# Patient Record
Sex: MALE | Race: WHITE | Employment: OTHER | URBAN - METROPOLITAN AREA
[De-identification: names, ages, dates, MRNs, and addresses within clinical notes are randomized per-mention and may not be internally consistent; named-entity substitution may affect disease eponyms.]

---

## 2018-07-23 ENCOUNTER — HOSPITAL ENCOUNTER (EMERGENCY)
Facility: CLINIC | Age: 33
Discharge: HOME | End: 2018-07-23
Attending: EMERGENCY MEDICINE | Admitting: EMERGENCY MEDICINE
Payer: COMMERCIAL

## 2018-07-23 VITALS
BODY MASS INDEX: 19.6 KG/M2 | TEMPERATURE: 97.7 F | DIASTOLIC BLOOD PRESSURE: 81 MMHG | OXYGEN SATURATION: 100 % | HEIGHT: 71 IN | RESPIRATION RATE: 16 BRPM | SYSTOLIC BLOOD PRESSURE: 137 MMHG | WEIGHT: 140 LBS
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
## 2018-07-23 PROCEDURE — 99282 EMERGENCY DEPT VISIT SF MDM: CPT | Performed by: EMERGENCY MEDICINE

## 2018-07-23 PROCEDURE — 99282 EMERGENCY DEPT VISIT SF MDM: CPT | Mod: Z6 | Performed by: EMERGENCY MEDICINE

## 2018-07-23 PROCEDURE — 99281 EMR DPT VST MAYX REQ PHY/QHP: CPT

## 2018-07-23 NOTE — ED AVS SNAPSHOT
[facility] Emergency Department    [address] [address]    Phone:  [phone]    Fax:  [phone]                                       Luis Felipe Mart   MRN: [id number]    Department:  [facility] Emergency Department   Date of Visit:  7/23/2018           After Visit Summary Signature Page     I have received my discharge instructions, and my questions have been answered. I have discussed any challenges I see with this plan with the nurse or doctor.    ..........................................................................................................................................  Patient/Patient Representative Signature      ..........................................................................................................................................  Patient Representative Print Name and Relationship to Patient    ..................................................               ................................................  Date                                            Time    ..........................................................................................................................................  Reviewed by Signature/Title    ...................................................              ..............................................  Date                                                            Time

## 2018-07-23 NOTE — ED AVS SNAPSHOT
[facility] Emergency Department    [address] [address]    Phone:  [phone]    Fax:  [phone]                                       Luis Felipe Mart   MRN: [id number]    Department:  [facility] Emergency Department   Date of Visit:  7/23/2018           Patient Information     Date Of Birth          1985        Your diagnoses for this visit were:     Insect bite, initial encounter        You were seen by Rubén Dominguez MD.      Follow-up Information     Follow up with [facility] Emergency Department.    Specialty:  EMERGENCY MEDICINE    Why:  If symptoms worsen    Contact information:    [address] [address] [phone]    Additional information:    The medical center is located at   [address]. (between [address] in [place], [place] miles north   of [place]).      Discharge References/Attachments     INSECT BITE (ENGLISH)    SPIDER BITE, NON-POISONOUS (ENGLISH)      24 Hour Appointment Hotline       To make an appointment at any [facility] clinic, call [phone] ([phone]). If you don't have a family doctor or clinic, we will help you find one. [facility] clinics are conveniently located to serve the needs of you and your family.             Review of your medicines      Our records show that you are taking the medicines listed below. If these are incorrect, please call your family doctor or clinic.        Dose / Directions Last dose taken    HYDROcodone-acetaminophen 5-325 MG per tablet   Commonly known as:  NORCO   Dose:  1 tablet   Quantity:  24 tablet        Take 1 tablet by mouth every 6 hours as needed for pain   Refills:  1        NO ACTIVE MEDICATIONS        .   Refills:  0                Orders Needing Specimen Collection     None      Pending Results     No orders found from 7/21/2018 to 7/24/2018.            Pending Culture Results     No orders found from 7/21/2018 to 7/24/2018.           "  Pending Results Instructions     If you had any lab results that were not finalized at the time of your Discharge, you can call the ED Lab Result RN at [phone]. You will be contacted by this team for any positive Lab results or changes in treatment. The nurses are available 7 days a week from 10A to 6:30P.  You can leave a message 24 hours per day and they will return your call.        Test Results From Your Hospital Stay               Thank you for choosing [facility]       Thank you for choosing [facility] for your care. Our goal is always to provide you with excellent care. Hearing back from our patients is one way we can continue to improve our services. Please take a few minutes to complete the written survey that you may receive in the mail after you visit with us. Thank you!        [employer] Information     [email or web address] lets you send messages to your doctor, view your test results, renew your prescriptions, schedule appointments and more. To sign up, go to [email or web address] . Click on \"Log in\" on the left side of the screen, which will take you to the Welcome page. Then click on \"Sign up Now\" on the right side of the page.     You will be asked to enter the access code listed below, as well as some personal information. Please follow the directions to create your username and password.     Your access code is: [email or web address]  Expires: 10/21/2018 10:36 PM     Your access code will  in 90 days. If you need help or a new code, please call your [facility] clinic or [phone].        Care EveryWhere ID     This is your Care EveryWhere ID. This could be used by other organizations to access your [facility] medical records  [id number]        Equal Access to Services     [facility] : Hadii heena Forbes, haylee lino, ping harman. So [facility] [phone].    ATENCIÓN: Si habla español, tiene a [place] disposición servicios gratuitos de asistencia " livan Lysuad al [phone].    We comply with applicable federal civil rights laws and [place] laws. We do not discriminate on the basis of race, color, national origin, age, disability, sex, sexual orientation, or gender identity.            After Visit Summary       This is your record. Keep this with you and show to your community pharmacist(s) and doctor(s) at your next visit.

## 2018-07-24 NOTE — ED PROVIDER NOTES
"  History     Chief Complaint   Patient presents with     Insect Bite     L leg     HPI  Luis Felipe Mart is a 33 year old male who sustained an presumed insect bite to the left lower leg below the knee in the medial aspect, noted several days ago.  There is surrounding erythema and he is concerned about development of an infection.  No known tick bite.  No redness or streaking proximally.  No fever or chills.  No other systemic signs or symptoms.  No other acute complaints or concerns.    Problem List:    Patient Active Problem List    Diagnosis Date Noted     PSVT (paroxysmal supraventricular tachycardia) (H) 02/05/2013     Priority: Medium     On holter January 2013       CARDIOVASCULAR SCREENING; LDL GOAL LESS THAN 160 10/31/2010     Priority: Medium        Past Medical History:    Past Medical History:   Diagnosis Date     AV block second degree      Lyme disease 2007/07       Past Surgical History:    Past Surgical History:   Procedure Laterality Date     NO HISTORY OF SURGERY         Family History:    Family History   Problem Relation Age of Onset     HEART DISEASE Maternal Grandfather      Cerebrovascular Disease Paternal Grandfather      C.A.D. Paternal Grandfather      by pass     HEART DISEASE Paternal Grandfather      MI     C.A.D. Paternal Grandmother      by pass       Social History:  Marital Status:   [2]  Social History   Substance Use Topics     Smoking status: Never Smoker     Smokeless tobacco: Never Used     Alcohol use No        Medications:      HYDROcodone-acetaminophen (NORCO) 5-325 MG per tablet   NO ACTIVE MEDICATIONS         Review of Systems   Constitutional: Negative.    Musculoskeletal: Negative.    Skin: Positive for color change ( Erythema about a presumed insect bite on the left lower leg).       Physical Exam   BP: 137/81  Heart Rate: 62  Temp: 97.7  F (36.5  C)  Resp: 16  Height: 180.3 cm (5' 11\")  Weight: 63.5 kg (140 lb)  SpO2: 100 %      Physical Exam   Constitutional: " He is oriented to person, place, and time. He appears well-developed and well-nourished. No distress.   Musculoskeletal: Normal range of motion. He exhibits no edema or tenderness.   Neurological: He is oriented to person, place, and time.   Skin: Skin is warm and dry. No rash noted. No pallor.   Approximately 2.5 cm area of dusky confluent erythema on the medial left lower leg below the knee with no warmth or lymphangitis.  This is not bright red, violaceous or cellulitic.  No abscess, fluctuance or crepitance.   Psychiatric: He has a normal mood and affect. His behavior is normal.   Nursing note and vitals reviewed.      ED Course     ED Course     Procedures                 No results found for this or any previous visit (from the past 24 hour(s)).    Medications - No data to display    Assessments & Plan (with Medical Decision Making)   Noninfected left lower extremity insect bite.  Doubt Lyme disease or EM rash.  He was instructed on supportive care and signs and symptoms would indicate need for emergent reevaluation.  He will return for new problems or concerns.    I have reviewed the nursing notes.    I have reviewed the findings, diagnosis, plan and need for follow up with the patient.    Discharge Medication List as of 7/23/2018 10:36 PM          Final diagnoses:   Insect bite, initial encounter       7/23/2018   [facility] EMERGENCY DEPARTMENT     Rubén Dominguez MD  07/27/18 0846

## 2018-07-24 NOTE — ED NOTES
Patient has small open insect bite on left leg below knee no other  Areas of distress  Patient states it itches more than hurts has no other complaints.

## 2018-07-27 ASSESSMENT — ENCOUNTER SYMPTOMS
CONSTITUTIONAL NEGATIVE: 1
COLOR CHANGE: 1
MUSCULOSKELETAL NEGATIVE: 1

## 2021-05-04 ENCOUNTER — APPOINTMENT (OUTPATIENT)
Dept: GENERAL RADIOLOGY | Facility: CLINIC | Age: 36
End: 2021-05-04
Attending: NURSE PRACTITIONER
Payer: COMMERCIAL

## 2021-05-04 ENCOUNTER — APPOINTMENT (OUTPATIENT)
Dept: URGENT CARE | Facility: CLINIC | Age: 36
End: 2021-05-04
Payer: COMMERCIAL

## 2021-05-04 ENCOUNTER — HOSPITAL ENCOUNTER (EMERGENCY)
Facility: CLINIC | Age: 36
Discharge: HOME | End: 2021-05-04
Attending: NURSE PRACTITIONER | Admitting: NURSE PRACTITIONER
Payer: COMMERCIAL

## 2021-05-04 ENCOUNTER — NURSE TRIAGE (OUTPATIENT)
Dept: NURSING | Facility: CLINIC | Age: 36
End: 2021-05-04

## 2021-05-04 VITALS
HEART RATE: 83 BPM | WEIGHT: 140 LBS | DIASTOLIC BLOOD PRESSURE: 78 MMHG | RESPIRATION RATE: 16 BRPM | TEMPERATURE: 98.2 F | BODY MASS INDEX: 19.6 KG/M2 | OXYGEN SATURATION: 100 % | SYSTOLIC BLOOD PRESSURE: 136 MMHG | HEIGHT: 71 IN

## 2021-05-04 DIAGNOSIS — U07.1 CLINICAL DIAGNOSIS OF COVID-19: ICD-10-CM

## 2021-05-04 PROCEDURE — 94640 AIRWAY INHALATION TREATMENT: CPT | Performed by: NURSE PRACTITIONER

## 2021-05-04 PROCEDURE — 71045 X-RAY EXAM CHEST 1 VIEW: CPT

## 2021-05-04 PROCEDURE — G0463 HOSPITAL OUTPT CLINIC VISIT: HCPCS | Mod: 25 | Performed by: NURSE PRACTITIONER

## 2021-05-04 PROCEDURE — 250N000013 HC RX MED GY IP 250 OP 250 PS 637: Performed by: NURSE PRACTITIONER

## 2021-05-04 PROCEDURE — 99214 OFFICE O/P EST MOD 30 MIN: CPT | Performed by: NURSE PRACTITIONER

## 2021-05-04 RX ORDER — ALBUTEROL SULFATE 90 UG/1
6 AEROSOL, METERED RESPIRATORY (INHALATION) ONCE
Status: COMPLETED | OUTPATIENT
Start: 2021-05-04 | End: 2021-05-04

## 2021-05-04 RX ORDER — GUAIFENESIN 600 MG/1
1200 TABLET, EXTENDED RELEASE ORAL 2 TIMES DAILY
Qty: 40 TABLET | Refills: 0 | Status: SHIPPED | OUTPATIENT
Start: 2021-05-04 | End: 2021-05-14

## 2021-05-04 RX ORDER — BENZONATATE 200 MG/1
200 CAPSULE ORAL 3 TIMES DAILY PRN
Qty: 30 CAPSULE | Refills: 0 | Status: SHIPPED | OUTPATIENT
Start: 2021-05-04

## 2021-05-04 RX ADMIN — ALBUTEROL SULFATE 6 PUFF: 90 AEROSOL, METERED RESPIRATORY (INHALATION) at 17:36

## 2021-05-04 ASSESSMENT — MIFFLIN-ST. JEOR: SCORE: 1597.17

## 2021-05-04 NOTE — ED PROVIDER NOTES
History     Chief Complaint   Patient presents with     covid concern     HPI    SUBJECTIVE: Luis Felipe Mart  is here today because of:covid concern  The patient has had symptoms of cough, chest congestion and wheezing, fatigue, decreased appetite, decreased activity.   Onset of symptoms was 4 days ago. Course of illness is same.  Original covid symptoms started 10 days ago and included body aches, chills, headache, earache, lost of taste and smell and these symptoms have resolved.  Patient denies vomiting and diarrhea  Treatment measures tried include emergenC packets, water.  Patient is not exposed to tobacco, no THC use, denies asthma use, no previous hx of pneumonia    Allergies:  Allergies   Allergen Reactions     Minocycline Swelling     Seems to get throat swelling, feels like lump in throat, and sometimes a little effort to breathe.       Problem List:    Patient Active Problem List    Diagnosis Date Noted     PSVT (paroxysmal supraventricular tachycardia) (H) 02/05/2013     Priority: Medium     On holter January 2013       CARDIOVASCULAR SCREENING; LDL GOAL LESS THAN 160 10/31/2010     Priority: Medium        Past Medical History:    Past Medical History:   Diagnosis Date     AV block second degree      Lyme disease 2007/07       Past Surgical History:    Past Surgical History:   Procedure Laterality Date     NO HISTORY OF SURGERY         Family History:    Family History   Problem Relation Age of Onset     Heart Disease Maternal Grandfather      Cerebrovascular Disease Paternal Grandfather      C.A.D. Paternal Grandfather         by pass     Heart Disease Paternal Grandfather         ANTOINE JENKINSASONIA. Paternal Grandmother         by pass       Social History:  Marital Status:   [2]  Social History     Tobacco Use     Smoking status: Never Smoker     Smokeless tobacco: Never Used   Substance Use Topics     Alcohol use: No     Drug use: No        Medications:    benzonatate (TESSALON) 200 MG  "capsule  guaiFENesin (MUCINEX) 600 MG 12 hr tablet  HYDROcodone-acetaminophen (NORCO) 5-325 MG per tablet  NO ACTIVE MEDICATIONS      Review of Systems  As mentioned above in the history present illness. All other systems were reviewed and are negative.    Physical Exam   BP: 136/78  Pulse: 83  Temp: 98.2  F (36.8  C)  Resp: 16  Height: 180.3 cm (5' 11\")  Weight: 63.5 kg (140 lb)  SpO2: 100 %      Physical Exam  GENERAL: alert and mildly ill appearing non toxic  EYES:  Right conjunctiva is not injected and without discharge.  Left conjunctiva is not injected and without discharge.  EARS: Right TM is normal: no effusions, no erythema, and normal landmarks.  Left TM is normal: no effusions, no erythema, and normal landmarks.  NOSE: Nasal mucosa is normal.  Sinus not tender.  THROAT: normal.  NECK: supple with no adenopathy  CARDIAC:NORMAL - regular rate and rhythm without murmur.  RESP: Normal - CTA without rales, rhonchi, or wheezing.  SKIN: normal    ED Course        Procedures    Results for orders placed or performed during the hospital encounter of 05/04/21 (from the past 24 hour(s))   XR Chest Port 1 View    Narrative    XR CHEST PORT 1 VIEW 5/4/2021 5:52 PM    HISTORY: worsening cough, positive COVID 19    COMPARISON: Chest x-ray 8/29/2012      Impression    IMPRESSION: No acute findings. The lungs are clear and there are no  pleural effusions. Normal heart size.    SAMANTHA PALMER MD       Medications   albuterol (PROAIR HFA/PROVENTIL HFA/VENTOLIN HFA) 108 (90 Base) MCG/ACT inhaler 6 puff (6 puffs Inhalation Given 5/4/21 5571)       Assessments & Plan (with Medical Decision Making)     I have reviewed the nursing notes.    I have reviewed the findings, diagnosis, plan and need for follow up with the patient.  Medical Decision Making:  CXR is indicated.  Rapid Strep test is not indicated.  Patient known Covid positive    Assessment:  1) Covid illness with persistent cough and presently without signs of Covid " pneumonia and low concern for PE..    PLAN:  We will treat with comfort measures of albuterol as needed, Tessalon Perles, Robitussin.  Discussed worrisome reasons to return.  Follow-up as needed.  Covid get well referral loop placed.  Follow up with any questions or problems    Discharge Medication List as of 5/4/2021  6:27 PM      START taking these medications    Details   benzonatate (TESSALON) 200 MG capsule Take 1 capsule (200 mg) by mouth 3 times daily as needed for cough, Disp-30 capsule, R-0, E-Prescribe      guaiFENesin (MUCINEX) 600 MG 12 hr tablet Take 2 tablets (1,200 mg) by mouth 2 times daily for 10 days, Disp-40 tablet, R-0, E-Prescribe             Final diagnoses:   Clinical diagnosis of COVID-19       5/4/2021   [facility] EMERGENCY DEPT     Mathew, Irma Guzman, MIKALA CNP  05/04/21 4847

## 2021-05-04 NOTE — TELEPHONE ENCOUNTER
Patient calling - says he has had COVID19 for the last 10 days.  He says most of his symptoms have resolved but in the past couple days he is feeling slight pain in his chest, chest congestion and shortness of breath.  Says symptoms are worse at night.    Triaged to disposition of Go to ED Now.    Jennifer Eli RN  Triage Nurse Advisor    COVID 19 Nurse Triage Plan/Patient Instructions    Please be aware that novel coronavirus (COVID-19) may be circulating in the community. If you develop symptoms such as fever, cough, or SOB or if you have concerns about the presence of another infection including coronavirus (COVID-19), please contact your health care provider or visit [email or web address].     Disposition/Instructions    ED Visit recommended. Follow protocol based instructions.     Bring Your Own Device:  Please also bring your smart device(s) (smart phones, tablets, laptops) and their charging cables for your personal use and to communicate with your care team during your visit.    Thank you for taking steps to prevent the spread of this virus.  o Limit your contact with others.  o Wear a simple mask to cover your cough.  o Wash your hands well and often.    Resources    [facility]: About COVID-19: [email or web address]    CDC: What to Do If You're Sick: www.cdc.gov/coronavirus/2019-ncov/about/steps-when-sick.html    CDC: Ending Home Isolation: www.cdc.gov/coronavirus/2019-ncov/hcp/disposition-in-home-patients.html     CDC: Caring for Someone: www.cdc.gov/coronavirus/2019-ncov/if-you-are-sick/care-for-someone.html     [facility]: Interim Guidance for Hospital Discharge to Home: www.health.[facility].[place].us/diseases/coronavirus/hcp/hospdischarge.pdf    [facility] clinical trials (COVID-19 research studies): [email or web address].[email or web address]/umn-clinical-trials     Below are the COVID-19 hotlines at the [place] Department of Health ([facility]). Interpreters are available.   o For health questions: Call  [phone] or [phone] (7 a.m. to 7 p.m.)  o For questions about schools and childcare: Call [phone] or [phone] (7 a.m. to 7 p.m.)     Reason for Disposition    SEVERE or constant chest pain or pressure (Exception: mild central chest pain, present only when coughing)    MODERATE difficulty breathing (e.g., speaks in phrases, SOB even at rest, pulse 100-120)    Additional Information    Negative: SEVERE difficulty breathing (e.g., struggling for each breath, speaks in single words)    Negative: Difficult to awaken or acting confused (e.g., disoriented, slurred speech)    Negative: Bluish (or gray) lips or face now    Negative: Shock suspected (e.g., cold/pale/clammy skin, too weak to stand, low BP, rapid pulse)    Negative: Sounds like a life-threatening emergency to the triager    Negative: [1] COVID-19 exposure AND [2] has not completed COVID-19 vaccine series AND [3] no symptoms    Negative: [1] COVID-19 exposure AND [2] completed COVID-19 vaccine series (fully vaccinated) AND [3] no symptoms    Negative: COVID-19 vaccine reaction suspected (e.g., fever, headache, muscle aches) occurring during days 1-3 after getting vaccine    Negative: COVID-19 vaccine, questions about    Negative: [1] COVID-19 vaccine series completed (fully vaccinated) in past 3 months AND [2] new-onset of COVID-19 symptoms BUT [3] no known exposure    Negative: [1] Had lab test confirmed COVID-19 infection within last 3 monthsAND [2] new-onset of COVID-19 symptoms BUT [3] no known exposure    Negative: [1] Lives with someone known to have influenza (flu test positive) AND [2] flu-like symptoms (e.g., cough, runny nose, sore throat, SOB; with or without fever)    Negative: [1] Adult with possible COVID-19 symptoms AND [2] triager concerned about severity of symptoms or other causes    Negative: COVID-19 and breastfeeding, questions about    Protocols used: CORONAVIRUS (COVID-19) DIAGNOSED OR [id number] 3.25

## 2021-05-04 NOTE — DISCHARGE INSTRUCTIONS
You may use the albuterol inhaler 2 to 6 puffs every 6 hours as needed for shortness of breath or cough.    You may take the Tessalon Perles 3 times daily as needed for cough.  I recommend the Mucinex 1200 mg by mouth twice daily to help loosen things up and decrease the congestion.  I recommend if virtual visit with your provider for follow-up with the Covid.

## 2021-05-23 ENCOUNTER — HEALTH MAINTENANCE LETTER (OUTPATIENT)
Age: 36
End: 2021-05-23

## 2021-08-15 ENCOUNTER — HOSPITAL ENCOUNTER (EMERGENCY)
Facility: CLINIC | Age: 36
Discharge: HOME | End: 2021-08-15
Attending: FAMILY MEDICINE | Admitting: FAMILY MEDICINE
Payer: COMMERCIAL

## 2021-08-15 VITALS
DIASTOLIC BLOOD PRESSURE: 69 MMHG | TEMPERATURE: 98 F | BODY MASS INDEX: 19.53 KG/M2 | HEART RATE: 69 BPM | SYSTOLIC BLOOD PRESSURE: 113 MMHG | RESPIRATION RATE: 18 BRPM | OXYGEN SATURATION: 100 % | WEIGHT: 140 LBS

## 2021-08-15 DIAGNOSIS — R00.2 PALPITATIONS: ICD-10-CM

## 2021-08-15 LAB
ANION GAP SERPL CALCULATED.3IONS-SCNC: 6 MMOL/L (ref 3–14)
BASOPHILS # BLD AUTO: 0 10E3/UL (ref 0–0.2)
BASOPHILS NFR BLD AUTO: 1 %
BUN SERPL-MCNC: 11 MG/DL (ref 7–30)
CALCIUM SERPL-MCNC: 8.8 MG/DL (ref 8.5–10.1)
CHLORIDE BLD-SCNC: 109 MMOL/L (ref 94–109)
CO2 SERPL-SCNC: 26 MMOL/L (ref 20–32)
CREAT SERPL-MCNC: 0.88 MG/DL (ref 0.66–1.25)
EOSINOPHIL # BLD AUTO: 0 10E3/UL (ref 0–0.7)
EOSINOPHIL NFR BLD AUTO: 0 %
ERYTHROCYTE [DISTWIDTH] IN BLOOD BY AUTOMATED COUNT: 11.6 % (ref 10–15)
GFR SERPL CREATININE-BSD FRML MDRD: >90 ML/MIN/1.73M2
GLUCOSE BLD-MCNC: 97 MG/DL (ref 70–99)
HCT VFR BLD AUTO: 44.2 % (ref 40–53)
HGB BLD-MCNC: 15.5 G/DL (ref 13.3–17.7)
HOLD SPECIMEN: NORMAL
IMM GRANULOCYTES # BLD: 0 10E3/UL
IMM GRANULOCYTES NFR BLD: 0 %
LYMPHOCYTES # BLD AUTO: 0.9 10E3/UL (ref 0.8–5.3)
LYMPHOCYTES NFR BLD AUTO: 28 %
MCH RBC QN AUTO: 31.3 PG (ref 26.5–33)
MCHC RBC AUTO-ENTMCNC: 35.1 G/DL (ref 31.5–36.5)
MCV RBC AUTO: 89 FL (ref 78–100)
MONOCYTES # BLD AUTO: 0.3 10E3/UL (ref 0–1.3)
MONOCYTES NFR BLD AUTO: 10 %
NEUTROPHILS # BLD AUTO: 2 10E3/UL (ref 1.6–8.3)
NEUTROPHILS NFR BLD AUTO: 61 %
NRBC # BLD AUTO: 0 10E3/UL
NRBC BLD AUTO-RTO: 0 /100
PLATELET # BLD AUTO: 158 10E3/UL (ref 150–450)
POTASSIUM BLD-SCNC: 4.1 MMOL/L (ref 3.4–5.3)
RBC # BLD AUTO: 4.96 10E6/UL (ref 4.4–5.9)
SODIUM SERPL-SCNC: 141 MMOL/L (ref 133–144)
TROPONIN I SERPL-MCNC: <0.015 UG/L (ref 0–0.04)
TSH SERPL DL<=0.005 MIU/L-ACNC: 1.26 MU/L (ref 0.4–4)
WBC # BLD AUTO: 3.3 10E3/UL (ref 4–11)

## 2021-08-15 PROCEDURE — 80048 BASIC METABOLIC PNL TOTAL CA: CPT | Performed by: FAMILY MEDICINE

## 2021-08-15 PROCEDURE — 99284 EMERGENCY DEPT VISIT MOD MDM: CPT | Mod: 25 | Performed by: FAMILY MEDICINE

## 2021-08-15 PROCEDURE — 85025 COMPLETE CBC W/AUTO DIFF WBC: CPT | Performed by: FAMILY MEDICINE

## 2021-08-15 PROCEDURE — 84484 ASSAY OF TROPONIN QUANT: CPT | Performed by: FAMILY MEDICINE

## 2021-08-15 PROCEDURE — 93005 ELECTROCARDIOGRAM TRACING: CPT | Performed by: FAMILY MEDICINE

## 2021-08-15 PROCEDURE — 99284 EMERGENCY DEPT VISIT MOD MDM: CPT | Performed by: FAMILY MEDICINE

## 2021-08-15 PROCEDURE — 93010 ELECTROCARDIOGRAM REPORT: CPT | Performed by: FAMILY MEDICINE

## 2021-08-15 PROCEDURE — 84443 ASSAY THYROID STIM HORMONE: CPT | Performed by: FAMILY MEDICINE

## 2021-08-15 PROCEDURE — 36415 COLL VENOUS BLD VENIPUNCTURE: CPT | Performed by: FAMILY MEDICINE

## 2021-08-15 NOTE — ED PROVIDER NOTES
"  HPI   The patient is a 36-year-old male presenting with palpitations and concern for reflux versus another source of epigastric discomfort. He does not smoke. No drugs of abuse. No alcohol. No marijuana.. He has a known history of Lyme disease with AV block. He has a known history of PSVT diagnosed by Holter in January, 2013. He does not use NSAIDs. No prescription medication. No herbal medication. No over-the-counter medication.    The patient has had intermittent episodes of racing heart. It seems to come on more at night. He will wake up with symptoms. The symptoms cause him to have a poor sleep. Occasionally, he will have symptoms during the day as well. They will last hours or seconds. He gets chest tightness with the palpitations. No chest pain. No back, neck, jaw, or arm pain. No associated nausea or vomiting. No diaphoresis. No leg pain or swelling. No shortness of breath. He does experience occasional epigastric discomfort that is described as burning and \"like I am going to have some diarrhea.\" He denies recent diarrhea or constipation. No hematochezia or melena. He does not experience what he would call heartburn on a regular basis. He does not take medication for his stomach. He denies anxiety.        Allergies:  Allergies   Allergen Reactions     Minocycline Swelling     Seems to get throat swelling, feels like lump in throat, and sometimes a little effort to breathe.     Problem List:    Patient Active Problem List    Diagnosis Date Noted     PSVT (paroxysmal supraventricular tachycardia) (H) 02/05/2013     Priority: Medium     On holter January 2013       CARDIOVASCULAR SCREENING; LDL GOAL LESS THAN 160 10/31/2010     Priority: Medium      Past Medical History:    Past Medical History:   Diagnosis Date     AV block second degree      Lyme disease 2007/07     Past Surgical History:    Past Surgical History:   Procedure Laterality Date     NO HISTORY OF SURGERY       Family History:    Family History "   Problem Relation Age of Onset     Heart Disease Maternal Grandfather      Cerebrovascular Disease Paternal Grandfather      LULA. Paternal Grandfather         by pass     Heart Disease Paternal Grandfather         ANTOINE COTTON. Paternal Grandmother         by pass     Social History:  Marital Status:   [2]  Social History     Tobacco Use     Smoking status: Never Smoker     Smokeless tobacco: Never Used   Substance Use Topics     Alcohol use: No     Drug use: No      Medications:    benzonatate (TESSALON) 200 MG capsule  HYDROcodone-acetaminophen (NORCO) 5-325 MG per tablet  NO ACTIVE MEDICATIONS      Review of Systems   All other systems reviewed and are negative.      PE   BP: 135/84  Pulse: 88  Temp: 98  F (36.7  C)  Resp: 16  Weight: 63.5 kg (140 lb)  SpO2: 99 %  Physical Exam  Vitals and nursing note reviewed.   Constitutional:       General: He is not in acute distress.     Comments: Call. Not anxious.   HENT:      Head: Atraumatic.      Right Ear: External ear normal.      Left Ear: External ear normal.      Nose: Nose normal.      Mouth/Throat:      Mouth: Mucous membranes are moist.      Pharynx: Oropharynx is clear.   Eyes:      General: No scleral icterus.     Extraocular Movements: Extraocular movements intact.      Conjunctiva/sclera: Conjunctivae normal.      Pupils: Pupils are equal, round, and reactive to light.   Cardiovascular:      Rate and Rhythm: Normal rate.      Heart sounds: Normal heart sounds.   Pulmonary:      Effort: Pulmonary effort is normal. No respiratory distress.   Abdominal:      Comments: Not tender to palpation.   Musculoskeletal:         General: Normal range of motion.      Cervical back: Normal range of motion.   Skin:     General: Skin is warm and dry.   Neurological:      Mental Status: He is alert and oriented to person, place, and time.   Psychiatric:         Behavior: Behavior normal.         ED COURSE and MDM   0954. The patient has palpitations and epigastric  discomfort. Lab values pending. EKG pending.    1043.  The patient has an abnormal white blood cell count, perhaps related to a recent or ongoing viral infection?  Low concern for severe cardiac involvement.  Zio patch order placed.  Follow-up discussed.  Consider reflux, recommendations provided.  Return for worsening.    EKG  (1042)   Interpretation performed by me.  Rate: 65     Rhythm: sinus     Axis: R  Intervals: IA (12-2) 154, QRS (<12) 98, QTc (>5) 398  P wave: nl     QRS complex: nl  ST segment / T-wave: nl  Conclusion: Right-sided conduction defect with right axis deviation.    LABS  Labs Ordered and Resulted from Time of ED Arrival Up to the Time of Departure from the ED   CBC WITH PLATELETS AND DIFFERENTIAL - Abnormal; Notable for the following components:       Result Value    WBC Count 3.3 (*)     All other components within normal limits   TSH WITH FREE T4 REFLEX - Normal   TROPONIN I - Normal   BASIC METABOLIC PANEL - Normal   EXTRA BLUE TOP TUBE   EXTRA RED TOP TUBE   EXTRA GREEN TOP (LITHIUM HEPARIN) TUBE   EXTRA GREEN TOP (LITHIUM HEPARIN) TUBE   EXTRA PURPLE TOP TUBE   CBC WITH PLATELETS & DIFFERENTIAL    Narrative:     The following orders were created for panel order CBC with platelets, differential.  Procedure                               Abnormality         Status                     ---------                               -----------         ------                     CBC with platelets and d...[[id number]]  Abnormal            Final result                 Please view results for these tests on the individual orders.   EXTRA TUBE    Narrative:     The following orders were created for panel order [place] Draw.  Procedure                               Abnormality         Status                     ---------                               -----------         ------                     Extra Blue Top Tube[[id number]]                              Final result               Extra Red Top  Tube[[id number]]                               Final result               Extra Green Top (Lithium...[[id number]]                      Final result               Extra Green Top (Lithium...[[id number]]                      Final result               Extra Purple Top Tube[[id number]]                            Final result                 Please view results for these tests on the individual orders.       IMAGING  Images reviewed by me.  Radiology report also reviewed.  Leadless EKG Monitor 3 to 7 Days    (Results Pending)       Procedures    Medications - No data to display      IMPRESSION       ICD-10-CM    1. Palpitations  R00.2 Leadless EKG Monitor 3 to 7 Days            Medication List      There are no discharge medications for this visit.                       Michael Kong MD  08/15/21 1047

## 2021-08-15 NOTE — DISCHARGE INSTRUCTIONS
Return to the Emergency Room if the following occurs:     Severely worsened palpitations that are not going away, fainting, trouble breathing, vomiting and dehydration, or for any concern at anytime.    Or, follow-up with the following provider as we discussed:     An order for a Zio patch was placed at the time of your discharge.  Call to schedule an appointment for placement.  Follow-up with your primary physician when this is complete.    Medications discussed:    Prilosec daily x 30 days.  Maalox/Mylanta/Tums for acute pain.  Avoid caffeine, smoking, chewing tobacco, ibuprofen/advil/aleve, alcohol, eating within 2-3 hours of bed.    If you received pain-relieving or sedating medication during your time in the ER, avoid alcohol, driving automobiles, or working with machinery.  Also, a responsible adult must stay with you.        Call the Nurse Advice Line at [phone] or [phone] for any concern at anytime.

## 2021-08-15 NOTE — ED NOTES
"Pt reports feeling \"like my heart is racing\" off and on over the past 2 weeks. Pt reports worsens with activity. Pt reports also having mid to epigastic abdominal pain intermittent, pt reports when the pain comes on he feels like his heart is racing. Pt denies chest pain, sob. Some nausea with the abdominal pain, no vomiting. Pt describes pain as a burning/cramping. Pt reports past night pain was worse after eating.   "

## 2021-09-12 ENCOUNTER — HEALTH MAINTENANCE LETTER (OUTPATIENT)
Age: 36
End: 2021-09-12

## 2022-06-19 ENCOUNTER — HEALTH MAINTENANCE LETTER (OUTPATIENT)
Age: 37
End: 2022-06-19

## 2022-11-19 ENCOUNTER — HEALTH MAINTENANCE LETTER (OUTPATIENT)
Age: 37
End: 2022-11-19

## 2023-07-02 ENCOUNTER — HEALTH MAINTENANCE LETTER (OUTPATIENT)
Age: 38
End: 2023-07-02